# Patient Record
Sex: MALE | Race: WHITE | Employment: OTHER | URBAN - METROPOLITAN AREA
[De-identification: names, ages, dates, MRNs, and addresses within clinical notes are randomized per-mention and may not be internally consistent; named-entity substitution may affect disease eponyms.]

---

## 2019-05-27 ENCOUNTER — HOSPITAL ENCOUNTER (INPATIENT)
Age: 74
LOS: 3 days | Discharge: HOME | DRG: 042 | End: 2019-05-30
Attending: INTERNAL MEDICINE | Admitting: INTERNAL MEDICINE
Payer: MEDICARE

## 2019-05-27 PROBLEM — I10 ESSENTIAL HYPERTENSION: Status: ACTIVE | Noted: 2019-05-27

## 2019-05-27 PROBLEM — G45.9 TIA (TRANSIENT ISCHEMIC ATTACK): Status: ACTIVE | Noted: 2019-05-27

## 2019-05-27 PROBLEM — E78.5 HLD (HYPERLIPIDEMIA): Status: ACTIVE | Noted: 2019-05-27

## 2019-05-27 PROBLEM — J44.9 COPD (CHRONIC OBSTRUCTIVE PULMONARY DISEASE) (HCC): Status: ACTIVE | Noted: 2019-05-27

## 2019-05-27 LAB
TROPONIN: <0.01 NG/ML (ref 0–0.03)
TROPONIN: <0.01 NG/ML (ref 0–0.03)

## 2019-05-27 PROCEDURE — 99221 1ST HOSP IP/OBS SF/LOW 40: CPT | Performed by: PSYCHIATRY & NEUROLOGY

## 2019-05-27 PROCEDURE — 84484 ASSAY OF TROPONIN QUANT: CPT

## 2019-05-27 PROCEDURE — 36415 COLL VENOUS BLD VENIPUNCTURE: CPT

## 2019-05-27 PROCEDURE — 2060000000 HC ICU INTERMEDIATE R&B

## 2019-05-27 PROCEDURE — 94640 AIRWAY INHALATION TREATMENT: CPT

## 2019-05-27 PROCEDURE — 6370000000 HC RX 637 (ALT 250 FOR IP): Performed by: INTERNAL MEDICINE

## 2019-05-27 PROCEDURE — 2580000003 HC RX 258: Performed by: INTERNAL MEDICINE

## 2019-05-27 PROCEDURE — 2700000000 HC OXYGEN THERAPY PER DAY

## 2019-05-27 PROCEDURE — 6360000002 HC RX W HCPCS: Performed by: INTERNAL MEDICINE

## 2019-05-27 RX ORDER — NITROGLYCERIN 40 MG/1
1 PATCH TRANSDERMAL PRN
Status: ON HOLD | COMMUNITY
End: 2019-05-30 | Stop reason: HOSPADM

## 2019-05-27 RX ORDER — AMLODIPINE BESYLATE 2.5 MG/1
2.5 TABLET ORAL DAILY
Status: DISCONTINUED | OUTPATIENT
Start: 2019-05-27 | End: 2019-05-30 | Stop reason: HOSPADM

## 2019-05-27 RX ORDER — FLUTICASONE PROPIONATE 50 MCG
1 SPRAY, SUSPENSION (ML) NASAL DAILY
COMMUNITY

## 2019-05-27 RX ORDER — THIAMINE MONONITRATE (VIT B1) 100 MG
100 TABLET ORAL DAILY
Status: DISCONTINUED | OUTPATIENT
Start: 2019-05-27 | End: 2019-05-30 | Stop reason: HOSPADM

## 2019-05-27 RX ORDER — ISOSORBIDE MONONITRATE 60 MG/1
60 TABLET, EXTENDED RELEASE ORAL DAILY
Status: DISCONTINUED | OUTPATIENT
Start: 2019-05-27 | End: 2019-05-30 | Stop reason: HOSPADM

## 2019-05-27 RX ORDER — ROSUVASTATIN CALCIUM 10 MG/1
10 TABLET, COATED ORAL DAILY
Status: DISCONTINUED | OUTPATIENT
Start: 2019-05-27 | End: 2019-05-30 | Stop reason: HOSPADM

## 2019-05-27 RX ORDER — ACETAMINOPHEN 325 MG/1
650 TABLET ORAL EVERY 4 HOURS PRN
Status: DISCONTINUED | OUTPATIENT
Start: 2019-05-27 | End: 2019-05-30 | Stop reason: HOSPADM

## 2019-05-27 RX ORDER — LEVETIRACETAM 500 MG/1
500 TABLET ORAL 2 TIMES DAILY
COMMUNITY

## 2019-05-27 RX ORDER — LEVETIRACETAM 500 MG/1
500 TABLET ORAL 2 TIMES DAILY
Status: DISCONTINUED | OUTPATIENT
Start: 2019-05-27 | End: 2019-05-30 | Stop reason: HOSPADM

## 2019-05-27 RX ORDER — FORMOTEROL FUMARATE 20 UG/2ML
20 SOLUTION RESPIRATORY (INHALATION) 2 TIMES DAILY
COMMUNITY

## 2019-05-27 RX ORDER — AMLODIPINE BESYLATE 2.5 MG/1
2.5 TABLET ORAL DAILY
COMMUNITY

## 2019-05-27 RX ORDER — BUDESONIDE 0.25 MG/2ML
250 INHALANT ORAL 2 TIMES DAILY
Status: DISCONTINUED | OUTPATIENT
Start: 2019-05-27 | End: 2019-05-30 | Stop reason: HOSPADM

## 2019-05-27 RX ORDER — ROSUVASTATIN CALCIUM 10 MG/1
10 TABLET, COATED ORAL DAILY
COMMUNITY

## 2019-05-27 RX ORDER — CLOPIDOGREL BISULFATE 75 MG/1
75 TABLET ORAL DAILY
Status: DISCONTINUED | OUTPATIENT
Start: 2019-05-27 | End: 2019-05-30 | Stop reason: HOSPADM

## 2019-05-27 RX ORDER — LABETALOL HYDROCHLORIDE 5 MG/ML
10 INJECTION, SOLUTION INTRAVENOUS EVERY 10 MIN PRN
Status: DISCONTINUED | OUTPATIENT
Start: 2019-05-27 | End: 2019-05-30 | Stop reason: HOSPADM

## 2019-05-27 RX ORDER — CETIRIZINE HYDROCHLORIDE 10 MG/1
10 TABLET ORAL DAILY
Status: DISCONTINUED | OUTPATIENT
Start: 2019-05-27 | End: 2019-05-30 | Stop reason: HOSPADM

## 2019-05-27 RX ORDER — EZETIMIBE 10 MG/1
10 TABLET ORAL DAILY
COMMUNITY

## 2019-05-27 RX ORDER — BUDESONIDE 0.25 MG/2ML
1 INHALANT ORAL 2 TIMES DAILY
COMMUNITY

## 2019-05-27 RX ORDER — PANTOPRAZOLE SODIUM 40 MG/1
40 TABLET, DELAYED RELEASE ORAL DAILY
COMMUNITY

## 2019-05-27 RX ORDER — ONDANSETRON 2 MG/ML
4 INJECTION INTRAMUSCULAR; INTRAVENOUS EVERY 6 HOURS PRN
Status: DISCONTINUED | OUTPATIENT
Start: 2019-05-27 | End: 2019-05-30 | Stop reason: HOSPADM

## 2019-05-27 RX ORDER — CLOPIDOGREL BISULFATE 75 MG/1
75 TABLET ORAL DAILY
COMMUNITY

## 2019-05-27 RX ORDER — SODIUM CHLORIDE 0.9 % (FLUSH) 0.9 %
10 SYRINGE (ML) INJECTION PRN
Status: DISCONTINUED | OUTPATIENT
Start: 2019-05-27 | End: 2019-05-30 | Stop reason: HOSPADM

## 2019-05-27 RX ORDER — TERAZOSIN 1 MG/1
1 CAPSULE ORAL 2 TIMES DAILY
COMMUNITY

## 2019-05-27 RX ORDER — SODIUM CHLORIDE 0.9 % (FLUSH) 0.9 %
10 SYRINGE (ML) INJECTION EVERY 12 HOURS SCHEDULED
Status: DISCONTINUED | OUTPATIENT
Start: 2019-05-27 | End: 2019-05-30 | Stop reason: HOSPADM

## 2019-05-27 RX ORDER — PANTOPRAZOLE SODIUM 40 MG/1
40 TABLET, DELAYED RELEASE ORAL DAILY
Status: DISCONTINUED | OUTPATIENT
Start: 2019-05-27 | End: 2019-05-30 | Stop reason: HOSPADM

## 2019-05-27 RX ORDER — ISOSORBIDE MONONITRATE 60 MG/1
60 TABLET, EXTENDED RELEASE ORAL DAILY
COMMUNITY

## 2019-05-27 RX ORDER — FLUTICASONE PROPIONATE 50 MCG
1 SPRAY, SUSPENSION (ML) NASAL DAILY
Status: DISCONTINUED | OUTPATIENT
Start: 2019-05-27 | End: 2019-05-30 | Stop reason: HOSPADM

## 2019-05-27 RX ORDER — FEXOFENADINE HCL 180 MG/1
180 TABLET ORAL DAILY
COMMUNITY

## 2019-05-27 RX ORDER — FORMOTEROL FUMARATE 20 UG/2ML
20 SOLUTION RESPIRATORY (INHALATION) 2 TIMES DAILY
Status: DISCONTINUED | OUTPATIENT
Start: 2019-05-27 | End: 2019-05-30 | Stop reason: HOSPADM

## 2019-05-27 RX ORDER — DOXAZOSIN MESYLATE 1 MG/1
1 TABLET ORAL DAILY
Status: DISCONTINUED | OUTPATIENT
Start: 2019-05-27 | End: 2019-05-30 | Stop reason: HOSPADM

## 2019-05-27 RX ORDER — ASPIRIN 81 MG/1
81 TABLET, CHEWABLE ORAL DAILY
COMMUNITY

## 2019-05-27 RX ORDER — ASPIRIN 81 MG/1
81 TABLET, CHEWABLE ORAL DAILY
Status: DISCONTINUED | OUTPATIENT
Start: 2019-05-27 | End: 2019-05-27 | Stop reason: SDUPTHER

## 2019-05-27 RX ORDER — THIAMINE MONONITRATE (VIT B1) 100 MG
100 TABLET ORAL DAILY
COMMUNITY

## 2019-05-27 RX ORDER — ASPIRIN 81 MG/1
81 TABLET ORAL DAILY
Status: DISCONTINUED | OUTPATIENT
Start: 2019-05-28 | End: 2019-05-30 | Stop reason: HOSPADM

## 2019-05-27 RX ADMIN — Medication 10 ML: at 21:10

## 2019-05-27 RX ADMIN — BUDESONIDE 250 MCG: 0.25 SUSPENSION RESPIRATORY (INHALATION) at 20:33

## 2019-05-27 RX ADMIN — LEVETIRACETAM 500 MG: 500 TABLET ORAL at 21:10

## 2019-05-27 RX ADMIN — FORMOTEROL FUMARATE DIHYDRATE 20 MCG: 20 SOLUTION RESPIRATORY (INHALATION) at 20:33

## 2019-05-27 RX ADMIN — METOPROLOL TARTRATE 25 MG: 25 TABLET, FILM COATED ORAL at 21:10

## 2019-05-27 ASSESSMENT — PAIN SCALES - GENERAL
PAINLEVEL_OUTOF10: 0
PAINLEVEL_OUTOF10: 0

## 2019-05-27 NOTE — H&P
Hospital Medicine History & Physical      PCP: Naina Reza    Date of Admission: 5/27/2019    Date of Service: Pt seen/examined on 5/27/2019 and placed in Observation. Chief Complaint:  TIA vs Stroke      History Of Present Illness:    Mr. Rolinda Saint, a 74y.o. year old male  who  has a past medical history of COPD (chronic obstructive pulmonary disease) ([facility] Lung cancer ([facility] MI (myocardial infarction) ([facility] and Renal cancer ([facility] Presented with complaints of visual changes that started last night. he states that he has had multiple strokes before but never like this. he describes it as being able to only see 1/2 of objects. he rates it as moderate in intensity. Nothing makes it better and nothing makes it worse. he admits to visual changes. he denies fevers, chills, chest pain, shortness of breath, nausea or vomiting. Past Medical History:          Diagnosis Date    COPD (chronic obstructive pulmonary disease) ([facility]     Lung cancer (HCC)     MI (myocardial infarction) ([facility]     History of 4 MIs.  Renal cancer [facility])        Past Surgical History:          Procedure Laterality Date    CAROTID ENDARTERECTOMY Left     CORONARY ANGIOPLASTY WITH STENT PLACEMENT      5 stents total    CORONARY ARTERY BYPASS GRAFT      4 vessels    LUNG REMOVAL, PARTIAL      left upper lung removal       Medications Prior to Admission:      Prior to Admission medications    Medication Sig Start Date End Date Taking?  Authorizing Provider   clopidogrel (PLAVIX) 75 MG tablet Take 75 mg by mouth daily   Yes Historical Provider, MD   pantoprazole (PROTONIX) 40 MG tablet Take 40 mg by mouth daily   Yes Historical Provider, MD   levETIRAcetam (KEPPRA) 500 MG tablet Take 500 mg by mouth 2 times daily   Yes Historical Provider, MD   ezetimibe (ZETIA) 10 MG tablet Take 10 mg by mouth daily   Yes Historical Provider, MD   terazosin (HYTRIN) 1 MG capsule Take 1 mg by mouth 2 times daily   Yes Historical Provider, MD metoprolol tartrate (LOPRESSOR) 25 MG tablet Take 25 mg by mouth 2 times daily   Yes Historical Provider, MD   aspirin 81 MG chewable tablet Take 81 mg by mouth daily   Yes Historical Provider, MD   fluticasone (FLONASE) 50 MCG/ACT nasal spray 1 spray by Each Nostril route daily   Yes Historical Provider, MD   isosorbide mononitrate (IMDUR) 60 MG extended release tablet Take 60 mg by mouth daily   Yes Historical Provider, MD   rosuvastatin (CRESTOR) 10 MG tablet Take 10 mg by mouth daily   Yes Historical Provider, MD   fexofenadine (ALLEGRA ALLERGY) 180 MG tablet Take 180 mg by mouth daily   Yes Historical Provider, MD   amLODIPine (NORVASC) 2.5 MG tablet Take 2.5 mg by mouth daily   Yes Historical Provider, MD   nitroGLYCERIN (NITRODUR) 0.2 MG/HR Place 1 patch onto the skin as needed   Yes Historical Provider, MD   vitamin B-1 (THIAMINE) 100 MG tablet Take 100 mg by mouth daily   Yes Historical Provider, MD   budesonide (PULMICORT) 0.25 MG/2ML nebulizer suspension Take 1 ampule by nebulization 2 times daily   Yes Historical Provider, MD   formoterol (PERFOROMIST) 20 MCG/2ML nebulizer solution Take 20 mcg by nebulization 2 times daily   Yes Historical Provider, MD   OXYGEN 3-4 L/day by Other route nightly as needed (As needed for shortness of breath and decreased SpO2)   Yes Historical Provider, MD       Allergies:  Morphine; Lipitor [atorvastatin calcium]; Prednisone; Adhesive tape; and Neosporin [neomycin-polymyxin-gramicidin]    Social History:    The patient currently lives at home  TOBACCO:   reports that he has quit smoking. He has a 27.00 pack-year smoking history. He has never used smokeless tobacco.  ETOH:   has no alcohol history on file. Family History:          Problem Relation Age of Onset    Heart Attack Mother     Cancer Father     Coronary Art Dis Father        REVIEW OF SYSTEMS:   Pertinent positives as noted in the HPI. All other systems reviewed and negative.     PHYSICAL EXAM:  BP (!) 116/53   Pulse 73   Temp 98.2 °F (36.8 °C) (Temporal)   Resp 18   Ht 5' 10\" (1.778 m)   Wt 185 lb (83.9 kg)   SpO2 95%   BMI 26.54 kg/m²   General appearance: No apparent distress, appears stated age and cooperative. HEENT: Normal cephalic, atraumatic without obvious deformity. Pupils equal, round, and reactive to light. Extra ocular muscles intact. Conjunctivae/corneas clear. Neck: Supple, with full range of motion. No jugular venous distention. Trachea midline. Respiratory:  Normal respiratory effort. Clear to auscultation, bilaterally without Rales/Wheezes/Rhonchi. Cardiovascular: Regular rate and rhythm with normal S1/S2 without murmurs, rubs or gallops. Brisk capillary refill. 2+ lower extremity pulses (dorsalis pedis). Abdomen: Soft, non-tender, non-distended with normal bowel sounds. Musculoskeletal: No clubbing, cyanosis or edema bilaterally. Full range of motion without deformity of right upper and lower extremities. Contracture noted of L upper extremity due to previous stroke. Brisk capillary refill. 2+ lower extremity pulses (dorsalis pedis). Skin: Normal skin color. No rashes or lesions. Neurologic:  Neurovascularly intact without any focal sensory/motor deficits. Cranial nerves: II-XII intact, grossly non-focal.  Psychiatric: Alert and oriented, thought content appropriate, normal insight    Labs:     No results for input(s): WBC, HGB, HCT, PLT in the last 72 hours. No results for input(s): NA, K, CL, CO2, BUN, CREATININE, CALCIUM, PHOS in the last 72 hours. Invalid input(s): MAGNES  No results for input(s): AST, ALT, BILIDIR, BILITOT, ALKPHOS in the last 72 hours. No results for input(s): INR in the last 72 hours.   Recent Labs     05/27/19  1758 05/27/19  2209   TROPONINI <0.01 <0.01       Urinalysis:    No results found for: NITRU, WBCUA, BACTERIA, RBCUA, BLOODU, SPECGRAV, GLUCOSEU    Imaging:  MRI brain without contrast    (Results Pending)   MRA Head WO Contrast    (Results Pending)   MRA Neck WO Contrast    (Results Pending)           ASSESSMENT:  Active Hospital Problems    Diagnosis Date Noted    TIA (transient ischemic attack) [G45.9] 05/27/2019    Essential hypertension [I10] 05/27/2019    HLD (hyperlipidemia) [E78.5] 05/27/2019    COPD (chronic obstructive pulmonary disease) ([facility] [J44.9] 05/27/2019    Cerebrovascular accident (CVA) due to embolism of right posterior cerebral artery [facility] [I63.431]      Plan:  · MRI of brain  · Neurology consult  · Continue home medications for HTN and HLD and COPD  · Lipid panel and Hb1C in the AM  Body mass index is 26.54 kg/m². DVT Prophylaxis  · Lovenox    Diet  DIET CARDIAC;    Code Status  Full Code    PT/OT Eval Status  · Consulted    Disposition  · [facility] [address], DO  Chief Hospitalist - [facility]  Please contact me via Perfect Serve        NOTE: This report was transcribed using voice recognition software. Every effort was made to ensure accuracy; however, inadvertent computerized transcription errors may be present.

## 2019-05-27 NOTE — CONSULTS
Consult to Neurology  Consult performed by: Malini Cabezas MD  Consult ordered by: Taryn Culp DO        Neurology Consult Note    5/27/2019     REASON FOR CONSULTATION:   Stroke       HISTORY OF PRESENT ILLNESS:   57-year-old man presented to another hospital for problem in his vision. Last night when he wanted to use bathroom and his digital clocks he was sitting minutes but not hours. The same problem continued when he presented to outside hospital but seems coming to hear it seems that it resolved to some extent. No additional weakness or numbness in his extremities. No headache. No dysphagia. No shortness of breath or chest pain. He was on aspirin 81 mg, Plavix and Keppra 500 mg twice a day and Crestor 10 mg which were all continued in this hospitalization. PMHx: HTN and HLP and multiple strokes in past      Prior to Admission medications    Medication Sig Start Date End Date Taking?  Authorizing Provider   clopidogrel (PLAVIX) 75 MG tablet Take 75 mg by mouth daily   Yes Historical Provider, MD   pantoprazole (PROTONIX) 40 MG tablet Take 40 mg by mouth daily   Yes Historical Provider, MD   levETIRAcetam (KEPPRA) 500 MG tablet Take 500 mg by mouth 2 times daily   Yes Historical Provider, MD   ezetimibe (ZETIA) 10 MG tablet Take 10 mg by mouth daily   Yes Historical Provider, MD   terazosin (HYTRIN) 1 MG capsule Take 1 mg by mouth 2 times daily   Yes Historical Provider, MD   metoprolol tartrate (LOPRESSOR) 25 MG tablet Take 25 mg by mouth 2 times daily   Yes Historical Provider, MD   aspirin 81 MG chewable tablet Take 81 mg by mouth daily   Yes Historical Provider, MD   fluticasone (FLONASE) 50 MCG/ACT nasal spray 1 spray by Each Nostril route daily   Yes Historical Provider, MD   isosorbide mononitrate (IMDUR) 60 MG extended release tablet Take 60 mg by mouth daily   Yes Historical Provider, MD   rosuvastatin (CRESTOR) 10 MG tablet Take 10 mg by mouth daily   Yes Historical Provider, MD B/L.  Coordination: intact F-N and H-S B/L. No dysmetria. Intact ANAND.      ASSESSMENT:  Left homonymous hemianopsia which resolved to some extent and left him with left upper quadrantanopsia. CT from outside hospital showed possibility of old left parietal stroke and subacute right occipital stroke on my own personal reading. There is no official report from outside [facility].      PLAN:   Continue aspirin and Plavix  Continue Crestor 10 mg  LDL and hemoglobin A1c  Brain MRI/MRA head and neck  Telemetry monitoring  Although unlikely I will request ESR and CRP to rule out GCA        Electronically signed by Fred Adams MD on 5/27/2019 at 6:30 PM

## 2019-05-28 ENCOUNTER — APPOINTMENT (OUTPATIENT)
Dept: MRI IMAGING | Age: 74
DRG: 042 | End: 2019-05-28
Attending: INTERNAL MEDICINE
Payer: MEDICARE

## 2019-05-28 ENCOUNTER — APPOINTMENT (OUTPATIENT)
Dept: GENERAL RADIOLOGY | Age: 74
DRG: 042 | End: 2019-05-28
Attending: INTERNAL MEDICINE
Payer: MEDICARE

## 2019-05-28 LAB
ANION GAP SERPL CALCULATED.3IONS-SCNC: 13 MMOL/L (ref 7–16)
BUN BLDV-MCNC: 16 MG/DL (ref 8–23)
C-REACTIVE PROTEIN: 0.3 MG/DL (ref 0–0.4)
CALCIUM SERPL-MCNC: 9 MG/DL (ref 8.6–10.2)
CHLORIDE BLD-SCNC: 94 MMOL/L (ref 98–107)
CHOLESTEROL, TOTAL: 126 MG/DL (ref 0–199)
CO2: 25 MMOL/L (ref 22–29)
CREAT SERPL-MCNC: 1.6 MG/DL (ref 0.7–1.2)
GFR AFRICAN AMERICAN: 51
GFR NON-AFRICAN AMERICAN: 42 ML/MIN/1.73
GLUCOSE BLD-MCNC: 123 MG/DL (ref 74–99)
HBA1C MFR BLD: 5.2 % (ref 4–5.6)
HDLC SERPL-MCNC: 57 MG/DL
LDL CHOLESTEROL CALCULATED: 48 MG/DL (ref 0–99)
POTASSIUM REFLEX MAGNESIUM: 4.2 MMOL/L (ref 3.5–5)
SEDIMENTATION RATE, ERYTHROCYTE: 35 MM/HR (ref 0–15)
SODIUM BLD-SCNC: 132 MMOL/L (ref 132–146)
TRIGL SERPL-MCNC: 105 MG/DL (ref 0–149)
VLDLC SERPL CALC-MCNC: 21 MG/DL

## 2019-05-28 PROCEDURE — 97530 THERAPEUTIC ACTIVITIES: CPT

## 2019-05-28 PROCEDURE — 94640 AIRWAY INHALATION TREATMENT: CPT

## 2019-05-28 PROCEDURE — 71045 X-RAY EXAM CHEST 1 VIEW: CPT

## 2019-05-28 PROCEDURE — 70547 MR ANGIOGRAPHY NECK W/O DYE: CPT

## 2019-05-28 PROCEDURE — 2580000003 HC RX 258: Performed by: INTERNAL MEDICINE

## 2019-05-28 PROCEDURE — 97162 PT EVAL MOD COMPLEX 30 MIN: CPT

## 2019-05-28 PROCEDURE — 2060000000 HC ICU INTERMEDIATE R&B

## 2019-05-28 PROCEDURE — 97165 OT EVAL LOW COMPLEX 30 MIN: CPT

## 2019-05-28 PROCEDURE — 2700000000 HC OXYGEN THERAPY PER DAY

## 2019-05-28 PROCEDURE — 70544 MR ANGIOGRAPHY HEAD W/O DYE: CPT

## 2019-05-28 PROCEDURE — 80061 LIPID PANEL: CPT

## 2019-05-28 PROCEDURE — 83036 HEMOGLOBIN GLYCOSYLATED A1C: CPT

## 2019-05-28 PROCEDURE — 70551 MRI BRAIN STEM W/O DYE: CPT

## 2019-05-28 PROCEDURE — 80048 BASIC METABOLIC PNL TOTAL CA: CPT

## 2019-05-28 PROCEDURE — 36415 COLL VENOUS BLD VENIPUNCTURE: CPT

## 2019-05-28 PROCEDURE — 6370000000 HC RX 637 (ALT 250 FOR IP): Performed by: INTERNAL MEDICINE

## 2019-05-28 PROCEDURE — 86140 C-REACTIVE PROTEIN: CPT

## 2019-05-28 PROCEDURE — 6360000002 HC RX W HCPCS: Performed by: INTERNAL MEDICINE

## 2019-05-28 PROCEDURE — 85651 RBC SED RATE NONAUTOMATED: CPT

## 2019-05-28 RX ADMIN — FORMOTEROL FUMARATE DIHYDRATE 20 MCG: 20 SOLUTION RESPIRATORY (INHALATION) at 22:00

## 2019-05-28 RX ADMIN — LEVETIRACETAM 500 MG: 500 TABLET ORAL at 21:20

## 2019-05-28 RX ADMIN — ASPIRIN 81 MG: 81 TABLET ORAL at 08:36

## 2019-05-28 RX ADMIN — METOPROLOL TARTRATE 25 MG: 25 TABLET, FILM COATED ORAL at 08:37

## 2019-05-28 RX ADMIN — BUDESONIDE 250 MCG: 0.25 SUSPENSION RESPIRATORY (INHALATION) at 22:02

## 2019-05-28 RX ADMIN — BUDESONIDE 250 MCG: 0.25 SUSPENSION RESPIRATORY (INHALATION) at 10:00

## 2019-05-28 RX ADMIN — ROSUVASTATIN CALCIUM 10 MG: 10 TABLET, FILM COATED ORAL at 08:37

## 2019-05-28 RX ADMIN — AMLODIPINE BESYLATE 2.5 MG: 2.5 TABLET ORAL at 08:37

## 2019-05-28 RX ADMIN — Medication 100 MG: at 08:37

## 2019-05-28 RX ADMIN — METOPROLOL TARTRATE 25 MG: 25 TABLET, FILM COATED ORAL at 21:20

## 2019-05-28 RX ADMIN — Medication 10 ML: at 21:21

## 2019-05-28 RX ADMIN — FORMOTEROL FUMARATE DIHYDRATE 20 MCG: 20 SOLUTION RESPIRATORY (INHALATION) at 10:00

## 2019-05-28 RX ADMIN — PANTOPRAZOLE SODIUM 40 MG: 40 TABLET, DELAYED RELEASE ORAL at 08:37

## 2019-05-28 RX ADMIN — DOXAZOSIN 1 MG: 2 TABLET ORAL at 08:37

## 2019-05-28 RX ADMIN — ISOSORBIDE MONONITRATE 60 MG: 60 TABLET ORAL at 08:37

## 2019-05-28 RX ADMIN — LEVETIRACETAM 500 MG: 500 TABLET ORAL at 08:37

## 2019-05-28 RX ADMIN — Medication 10 ML: at 08:37

## 2019-05-28 RX ADMIN — CLOPIDOGREL 75 MG: 75 TABLET, FILM COATED ORAL at 08:37

## 2019-05-28 RX ADMIN — FLUTICASONE PROPIONATE 1 SPRAY: 50 SPRAY, METERED NASAL at 08:36

## 2019-05-28 RX ADMIN — CETIRIZINE HYDROCHLORIDE 10 MG: 10 TABLET, FILM COATED ORAL at 08:37

## 2019-05-28 ASSESSMENT — PAIN SCALES - GENERAL
PAINLEVEL_OUTOF10: 0

## 2019-05-28 NOTE — PROGRESS NOTES
Occupational Therapy  OCCUPATIONAL THERAPY INITIAL EVALUATION      Date:2019  Patient Name: Chhaya Moody  MRN: [id number]  : 1945  Room: [address] [place] [address]    Evaluating OT: Bertha Bear. Susan Downs, OTR/L #[id number]      AM-PAC Daily Activity Raw Score: 15/24  Modified [place] Scale (MRS)  Score     Description  0             No symptoms  1             No significant disability despite symptoms  2             Slight disability; able to look after own affairs  3             Moderate disability; able to ambulate without assist/ requires assist with ADLs  4             Moderate/Severe disability;requires assist to ambulate/assist with ADLs  5             Severe disability;bedridden/incontinent   6               Score:   4    Recommended Adaptive Equipment: built up feeding utencil    Diagnosis: TIA   Patient Active Problem List   Diagnosis    TIA (transient ischemic attack)    Essential hypertension    HLD (hyperlipidemia)    COPD (chronic obstructive pulmonary disease) ([facility]    Cerebrovascular accident (CVA) due to embolism of right posterior cerebral artery ([facility]       Surgery: CABG, partial lung removal    Pertinent Medical History:   Past Medical History:   Diagnosis Date    COPD (chronic obstructive pulmonary disease) ([facility]     Lung cancer ([facility]     MI (myocardial infarction) ([facility] [address]     History of 4 MIs.  Renal cancer ([facility] [address] [facility]         Precautions:  Falls, O2 2L, decreased peripheral vision to R , patient states seizures     Home Living: Pt lives with wife and son- primary CG in a one story home  with no step(s) to enter and no rail(s); ramped entry bed/bath on main floor   Bathroom setup: walk in shower with seat and standard commode with HR   Equipment owned: w/c , ramp, shower seat, AE for LE dressing and bathing  Prior Level of Function:   Assisted as needed with ADLs ,  assisted with IADLs; using 2 canes for in home and w/c for community for ambulation.    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 and worked as a printer of [employer]    Pain Level: kidney pain- ahces  Cognition: A&O: 3/3; Follows multi step directions with increased volume   Memory:  fair    Sequencing:  good    Problem solving:  good    Judgement/safety:  good -     Functional Assessment:   Initial Eval Status  Date: 4/28/19 Treatment Status  Date: Short Term Goals  Treatment frequency: PRN 1-3 x/week   Feeding setup   Mod I    Grooming Setup for hair care using L hand  Mod I    UB Dressing SBA  Mod I   LB Dressing Dependent   Mod I with AE prn    Bathing n/t  SBA with AE and DME    Toileting CGA   Mod I    Bed Mobility  Supine to sit: min A   Sit to supine: n/t  Supine to sit: mod I   Sit to supine: mod I has HR's   Functional Transfers Sit to stand: CGA  Stand to sit:  SBA  Stand pivot: SBA  Mod I with AD prn   Functional Mobility CGA  Mod I    Balance Sitting:     Static:  good    Dynamic:fair  Standing: good-     Activity Tolerance Good- O2 on room air 94% after mobility  good   Visual/  Perceptual Glasses: noted decreased peripheral vision to L side         Safety Good-                 good     Hand dominance: L   UE ROM: RUE:  WFL  LUE:  WFL  Strength: RUE:  4+/5 LUE:  4/5   Strength R good L fair+   Fine Motor Coordination: R  WFL L poor with noted MP ulnar deviation and intrinsic tightness    Hearing: [place]   Sensation:  No c/o numbness or tingling  Tone:  WFL  Edema: none noted                            Comments/Treatment: Upon arrival, patient supine and agreeable to evaluation. OT evaluation performed with  Education on stroke signs and symptoms and decreased awareness of L side with peripheral vision cut noted on L side. Performed bed mobility, LE dressing, grooming seated in chair and STM word recall. Bathroom safety and AE /DME review performed.  At end of session, patient sitting up in chair and  with call light and phone within reach, all lines and tubes intact. Nursing notified. Skilled O2 monitoring performed throughout evaluation. OT for functional assessment of  ADL, Functional Transfer/Mobility Training, Equipment Needs, Energy Conservation Techniques, Pt/Family Education, Ther Ex- deep breathing for edema and pain control., OT role and POC reviewed. Patient  demo good- understanding of functional limitations and safety. Pt would benefit from continued skilled OT to increase functional independence and quality of life. Eval Complexity: low    Assessment of current deficits   Functional mobility [x]  ADLs [x] Strength []  Cognition []  Functional transfers  [x] IADLs [x] Safety Awareness [x]  Endurance [x]  Fine Motor Coordination [x] Balance [] Vision/perception [x] Sensation []   Gross Motor Coordination [] ROM [] Delirium []                  Motor Control []    Plan of Care  ADL retraining [x]   Equipment needs [x]   Neuromuscular re-education [] Energy Conservation Techniques [x]  Functional Transfer training [x] Patient and/or Family Education [x]  Functional Mobility training [x]  Environmental Modifications [x]  Cognitive re-training []   Compensatory techniques for ADLs [x]  Splinting Needs []   Positioning to improve overall function [x]   Therapeutic Activity [x]  Therapeutic Exercise  [x]  Visual/Perceptual: []    Delirium prevention/treatment  []   Other:  []    Rehab Potential: Good for established goals    Patient / Family Goal: home with assistance as needed    Patient and/or family were instructed diagnosis, prognosis/goals and plan of care. yes Demonstrated good understanding. [] Malnutrition indicators have been identified and nursing has been notified to ensure a dietitian consult is ordered. OT Evaluation + 15  treatment minutes  Tx. Time in: 8:15  Tx. Time out: 8:30    Isaac Alvarado.  [address], [address]

## 2019-05-28 NOTE — PROGRESS NOTES
Physical Therapy  Initial Assessment     Name: aLlo Naik  :   MRN: [id number]    Date of Service: 2019    Evaluating PT: Trenton Rubio PT, DPT [id number]    Room #:  0667/8394-M    Diagnosis: TIA  Precautions: Fall risk, R peripheral vision impaired, [place]    Pt lives with wife and son in a single story house with ramped entry. Bed is on the first floor and bath is on the first floor. Pt ambulated with 2 SPC Mod Independent prior to admission. Son is primary caregiver. Pt is on 4 L O2/min at night when at home. Initial Evaluation  Date: 19 Treatment Date: Short Term/ Long Term   Goals   AM-PAC 6 Clicks      Was pt agreeable to Eval/treatment? Yes     Does pt have pain? No current complaints of pain     Bed Mobility  Rolling: SBA  Supine to sit: NT  Sit to supine: Min A  Scooting: SBA toward center of bed  Rolling: Independent   Supine to sit: Independent   Sit to supine: Independent   Scooting: Independent    Transfers Sit to stand: Min A  Stand to sit: Min A  Stand pivot: Min A with [employer]  Sit to stand: Supervision  Stand to sit: Supervision  Stand pivot: Supervision with [employer]   Ambulation   15 feet with [employer] with Min A  >150 feet with [employer] with Supervision   Stair negotiation: NT  NA   ROM B UE: Refer to OT note  B LE: WFL     Strength B UE: Refer to OT note  B LE: 4/5 globally     Balance Sitting EOB: SBA  Dynamic standing: Min A with [employer]  Sitting EOB: Independent   Dynamic standing: Supervision with [employer]     Pt is A & O x: 4 to person, place, month/year, and situation. Sensation: Pt denies numbness and tingling to extremities. B LE light touch sensation intact. Coordination: NT  Edema: Unremarkable. ASSESSMENT    Patient education  Pt educated on hand placement during sit to stand transfer.     Patient response to education:   Pt verbalized understanding Pt demonstrated skill Pt requires further education in this area   Yes With assistance Yes     Comments:  Pt was seated in bedside chair upon room entry, agreeable to PT evaluation. Pt is very [place]. Pt required cueing for hand placement during sit to stand transfer. Pt was unsuccessful on first sit to stand transfer from chair. Pt was cued to increase forward lean and was successful on second attempt. Pt was mildly retropulsive once standing but able to correct with [employer]. Pt reported he was very tired and requested to keep ambulation distance to a minimum. Pt ambulates with very slow gait speed and small steps. Pt required increased time during turning but had fair steadiness throughout. Pt ambulated back to EOB. Pt attempted to abandon [employer] and began reaching for bed rail; pt cued for technique/safety. Pt was seated and assisted to supine position. Pt was mildly SOB with activity but O2 sat remained at 94% on RA. Pt was left supine in bed with all needs met at conclusion of session. Pts/family goals: To return home. Patient and or family understand(s) diagnosis, prognosis, and plan of care:  Yes. PLAN  PT care will be provided in accordance with the objectives noted above. Whenever appropriate, clear delegation orders will be provided for nursing staff. Exercises and functional mobility practice will be used as well as appropriate assistive devices or modalities to obtain goals. Patient and family education will also be administered as needed. Frequency of treatments: 2-5x/week x 2-3 days.     Time in: 0935  Time out: [address], PT, DPT  [id number]

## 2019-05-28 NOTE — PROGRESS NOTES
Hospitalist Progress Note      PCP: Claudette Castro    Date of Admission: 5/27/2019  Days in the hospital: 1    Chief Complaint: TIA     Hospital Course:     Seen by neurology. To have MRI of brain and MRA of head and neck. Subjective  Patient seen and examined at bedside. Patient states that he is feeling better today  Patient denies any fevers, chills, chest pain, shortness of breath, nausea, vomiting. Medications:  Reviewed    Infusion Medications   Scheduled Medications    sodium chloride flush  10 mL Intravenous 2 times per day    enoxaparin  40 mg Subcutaneous Daily    aspirin  81 mg Oral Daily    amLODIPine  2.5 mg Oral Daily    budesonide  250 mcg Nebulization BID    clopidogrel  75 mg Oral Daily    cetirizine  10 mg Oral Daily    fluticasone  1 spray Each Nostril Daily    formoterol  20 mcg Nebulization BID    isosorbide mononitrate  60 mg Oral Daily    levETIRAcetam  500 mg Oral BID    metoprolol tartrate  25 mg Oral BID    pantoprazole  40 mg Oral Daily    rosuvastatin  10 mg Oral Daily    doxazosin  1 mg Oral Daily    vitamin B-1  100 mg Oral Daily     PRN Meds: sodium chloride flush, magnesium hydroxide, ondansetron, perflutren lipid microspheres, acetaminophen, labetalol      Intake/Output Summary (Last 24 hours) at 5/28/2019 0923  Last data filed at 5/28/2019 0415  Gross per 24 hour   Intake 360 ml   Output 1200 ml   Net -840 ml       Exam:    BP (!) 148/72   Pulse 67   Temp 97 °F (36.1 °C) (Temporal)   Resp 16   Ht 5' 10\" (1.778 m)   Wt 185 lb (83.9 kg)   SpO2 98%   BMI 26.54 kg/m²     General appearance: No apparent distress, appears stated age and cooperative. HEENT: Normal cephalic, atraumatic without obvious deformity. Pupils equal, round, and reactive to light. Extra ocular muscles intact. Conjunctivae/corneas clear. Neck: Supple, with full range of motion. No jugular venous distention. Trachea midline. Respiratory:  Normal respiratory effort.  Clear to auscultation, bilaterally without Rales/Wheezes/Rhonchi. Cardiovascular: Regular rate and rhythm with normal S1/S2 without murmurs, rubs or gallops. Brisk capillary refill. 2+ lower extremity pulses (dorsalis pedis). Abdomen: Soft, non-tender, non-distended with normal bowel sounds. Musculoskeletal: No clubbing, cyanosis or edema bilaterally. Full range of motion without deformity of right upper and lower extremities. Contracture noted of L upper extremity due to previous stroke. Brisk capillary refill. 2+ lower extremity pulses (dorsalis pedis). Skin: Normal skin color. No rashes or lesions. Neurologic:  Neurovascularly intact without any focal sensory/motor deficits. Cranial nerves: II-XII intact, grossly non-focal.  Psychiatric: Alert and oriented, thought content appropriate, normal insight          Labs:   No results for input(s): WBC, HGB, HCT, PLT in the last 72 hours. No results for input(s): NA, K, CL, CO2, BUN, CREATININE, CALCIUM, PHOS in the last 72 hours. Invalid input(s): MAGNES  No results for input(s): AST, ALT, BILIDIR, BILITOT, ALKPHOS in the last 72 hours. No results for input(s): INR in the last 72 hours. Recent Labs     05/27/19  1758 05/27/19  2209   TROPONINI <0.01 <0.01       Imaging:  MRI brain without contrast    (Results Pending)   MRA Head WO Contrast    (Results Pending)   MRA Neck WO Contrast    (Results Pending)         Assessment/Plan:  Active Hospital Problems    Diagnosis Date Noted    TIA (transient ischemic attack) [G45.9] 05/27/2019    Essential hypertension [I10] 05/27/2019    HLD (hyperlipidemia) [E78.5] 05/27/2019    COPD (chronic obstructive pulmonary disease) ([facility] [J44.9] 05/27/2019    Cerebrovascular accident (CVA) due to embolism of right posterior [facility] [I63.431]      Plan:  · Await MRI/MRA  · Continue with aspirin, plavix and statin. · Body mass index is 26.54 kg/m².     · DVT Prophylaxis  · Lovenox    · Diet  · DIET CARDIAC;    · Code Status  · Full Code    · PT/OT Eval Status  · Consulted     · Disposition  · Likely home with [address] [facility] - Chief Hospitalist  Please contact me through perfect serve    NOTE: This report was transcribed using voice recognition software. Every effort was made to ensure accuracy; however, inadvertent computerized transcription errors may be present.

## 2019-05-29 LAB
BACTERIA: NORMAL /HPF
BILIRUBIN URINE: NEGATIVE
BLOOD, URINE: NORMAL
CLARITY: CLEAR
COLOR: YELLOW
GLUCOSE URINE: NEGATIVE MG/DL
KETONES, URINE: NEGATIVE MG/DL
LEUKOCYTE ESTERASE, URINE: NEGATIVE
LV EF: 60 %
LVEF MODALITY: NORMAL
NITRITE, URINE: NEGATIVE
PH UA: 6.5 (ref 5–9)
PROTEIN UA: NEGATIVE MG/DL
RBC UA: NORMAL /HPF (ref 0–2)
SPECIFIC GRAVITY UA: <=1.005 (ref 1–1.03)
UROBILINOGEN, URINE: 0.2 E.U./DL
WBC UA: NORMAL /HPF (ref 0–5)

## 2019-05-29 PROCEDURE — 2060000000 HC ICU INTERMEDIATE R&B

## 2019-05-29 PROCEDURE — 94640 AIRWAY INHALATION TREATMENT: CPT

## 2019-05-29 PROCEDURE — 6370000000 HC RX 637 (ALT 250 FOR IP): Performed by: INTERNAL MEDICINE

## 2019-05-29 PROCEDURE — 99232 SBSQ HOSP IP/OBS MODERATE 35: CPT | Performed by: NURSE PRACTITIONER

## 2019-05-29 PROCEDURE — 93005 ELECTROCARDIOGRAM TRACING: CPT | Performed by: INTERNAL MEDICINE

## 2019-05-29 PROCEDURE — 2580000003 HC RX 258: Performed by: INTERNAL MEDICINE

## 2019-05-29 PROCEDURE — 6360000002 HC RX W HCPCS: Performed by: INTERNAL MEDICINE

## 2019-05-29 PROCEDURE — 81001 URINALYSIS AUTO W/SCOPE: CPT

## 2019-05-29 PROCEDURE — 93306 TTE W/DOPPLER COMPLETE: CPT

## 2019-05-29 RX ADMIN — BUDESONIDE 250 MCG: 0.25 SUSPENSION RESPIRATORY (INHALATION) at 19:49

## 2019-05-29 RX ADMIN — FLUTICASONE PROPIONATE 1 SPRAY: 50 SPRAY, METERED NASAL at 09:00

## 2019-05-29 RX ADMIN — DOXAZOSIN 1 MG: 2 TABLET ORAL at 08:57

## 2019-05-29 RX ADMIN — BUDESONIDE 250 MCG: 0.25 SUSPENSION RESPIRATORY (INHALATION) at 09:20

## 2019-05-29 RX ADMIN — METOPROLOL TARTRATE 25 MG: 25 TABLET, FILM COATED ORAL at 08:57

## 2019-05-29 RX ADMIN — AMLODIPINE BESYLATE 2.5 MG: 2.5 TABLET ORAL at 08:57

## 2019-05-29 RX ADMIN — CLOPIDOGREL 75 MG: 75 TABLET, FILM COATED ORAL at 08:57

## 2019-05-29 RX ADMIN — ENOXAPARIN SODIUM 40 MG: 40 INJECTION SUBCUTANEOUS at 08:57

## 2019-05-29 RX ADMIN — CETIRIZINE HYDROCHLORIDE 10 MG: 10 TABLET, FILM COATED ORAL at 08:57

## 2019-05-29 RX ADMIN — ISOSORBIDE MONONITRATE 60 MG: 60 TABLET ORAL at 08:57

## 2019-05-29 RX ADMIN — LEVETIRACETAM 500 MG: 500 TABLET ORAL at 21:18

## 2019-05-29 RX ADMIN — Medication 10 ML: at 21:18

## 2019-05-29 RX ADMIN — ASPIRIN 81 MG: 81 TABLET ORAL at 08:57

## 2019-05-29 RX ADMIN — ROSUVASTATIN CALCIUM 10 MG: 10 TABLET, FILM COATED ORAL at 08:57

## 2019-05-29 RX ADMIN — FORMOTEROL FUMARATE DIHYDRATE 20 MCG: 20 SOLUTION RESPIRATORY (INHALATION) at 09:20

## 2019-05-29 RX ADMIN — LEVETIRACETAM 500 MG: 500 TABLET ORAL at 08:57

## 2019-05-29 RX ADMIN — Medication 100 MG: at 08:57

## 2019-05-29 RX ADMIN — FORMOTEROL FUMARATE DIHYDRATE 20 MCG: 20 SOLUTION RESPIRATORY (INHALATION) at 19:49

## 2019-05-29 RX ADMIN — MAGNESIUM HYDROXIDE 30 ML: 400 SUSPENSION ORAL at 13:15

## 2019-05-29 NOTE — PROGRESS NOTES
Hospitalist Progress Note      PCP: Oneyda Sihpman    Date of Admission: 5/27/2019  Days in the hospital: 2    Chief Complaint: TIA     Hospital Course:     Seen by neurology. MRI of brain and MRA of head and neck performed. Found to have an acute ischemic event in the right temporal and occipital lobes. Electrophysiology consulted for possible cardioembolic source of stroke. Subjective  Patient seen and examined at bedside. Patient states that he is feeling better today  Patient denies any fevers, chills, chest pain, shortness of breath, nausea, vomiting. Wants to go home. Medications:  Reviewed    Infusion Medications   Scheduled Medications    sodium chloride flush  10 mL Intravenous 2 times per day    enoxaparin  40 mg Subcutaneous Daily    aspirin  81 mg Oral Daily    amLODIPine  2.5 mg Oral Daily    budesonide  250 mcg Nebulization BID    clopidogrel  75 mg Oral Daily    cetirizine  10 mg Oral Daily    fluticasone  1 spray Each Nostril Daily    formoterol  20 mcg Nebulization BID    isosorbide mononitrate  60 mg Oral Daily    levETIRAcetam  500 mg Oral BID    metoprolol tartrate  25 mg Oral BID    pantoprazole  40 mg Oral Daily    rosuvastatin  10 mg Oral Daily    doxazosin  1 mg Oral Daily    vitamin B-1  100 mg Oral Daily     PRN Meds: sodium chloride flush, magnesium hydroxide, ondansetron, perflutren lipid microspheres, acetaminophen, labetalol      Intake/Output Summary (Last 24 hours) at 5/29/2019 1220  Last data filed at 5/29/2019 0931  Gross per 24 hour   Intake 760 ml   Output 300 ml   Net 460 ml       Exam:    BP (!) 110/58   Pulse 78   Temp 98.8 °F (37.1 °C) (Temporal)   Resp 16   Ht 5' 10\" (1.778 m)   Wt 185 lb (83.9 kg)   SpO2 92%   BMI 26.54 kg/m²     General appearance: No apparent distress, appears stated age and cooperative. HEENT: Normal cephalic, atraumatic without obvious deformity. Pupils equal, round, and reactive to light.   Extra ocular muscles intact. Conjunctivae/corneas clear. Neck: Supple, with full range of motion. No jugular venous distention. Trachea midline. Respiratory:  Normal respiratory effort. Clear to auscultation, bilaterally without Rales/Wheezes/Rhonchi. Cardiovascular: Regular rate and rhythm with normal S1/S2 without murmurs, rubs or gallops. Brisk capillary refill. 2+ lower extremity pulses (dorsalis pedis). Abdomen: Soft, non-tender, non-distended with normal bowel sounds. Musculoskeletal: No clubbing, cyanosis or edema bilaterally. Full range of motion without deformity of right upper and lower extremities. Contracture noted of L upper extremity due to previous stroke. Brisk capillary refill. 2+ lower extremity pulses (dorsalis pedis). Skin: Normal skin color. No rashes or lesions. Neurologic:  Neurovascularly intact without any focal sensory/motor deficits. Cranial nerves: II-XII intact, grossly non-focal.  Psychiatric: Alert and oriented, thought content appropriate, normal insight          Labs:   No results for input(s): WBC, HGB, HCT, PLT in the last 72 hours. Recent Labs     05/28/19  0956      K 4.2   CL 94*   CO2 25   BUN 16   CREATININE 1.6*   CALCIUM 9.0     No results for input(s): AST, ALT, BILIDIR, BILITOT, ALKPHOS in the last 72 hours. No results for input(s): INR in the last 72 hours. Recent Labs     05/27/19  1758 05/27/19  2209   TROPONINI <0.01 <0.01       Imaging:  MRI brain without contrast   Final Result      Acute ischemic event involving the medial aspect of the right temporal   and occipital lobes in the distribution of the terminal branches of   the right posterior cerebral artery. Cortical atrophy and chronic periventricular microangiopathy are   present. Remote infarct or trauma to the left parietal lobe. Mastoid sinusitis. MRA Neck WO Contrast   Final Result      Negative MRA of the neck.       MRA Head WO Contrast   Final Result      Negative MRA of the [place]. XR CHEST 1 VW   Final Result   No evidence of acute cardiopulmonary pathology. No prosthetic valve or contraindication to MRI identified. Assessment/Plan:  Active Hospital Problems    Diagnosis Date Noted    TIA (transient ischemic attack) [G45.9] 05/27/2019    Essential hypertension [I10] 05/27/2019    HLD (hyperlipidemia) [E78.5] 05/27/2019    COPD (chronic obstructive pulmonary disease) ([facility] [facility] [J44.9] 05/27/2019    Cerebrovascular accident (CVA) due to embolism of right posterior cerebral artery ([facility] [address] [I63.431]      Plan:  · Found to have acute ischemic infarction in the right temporal and occipital lobes. · Electrophysiology consulted  · Discussed case with neurology  · Continue with aspirin, plavix and statin. Body mass index is 26.54 kg/m². · DVT Prophylaxis  · Lovenox    · Diet  DIET CARDIAC; Diet NPO, After Midnight    · Code Status  Full Code    · PT/OT Eval Status  · Consulted     · Disposition  · Likely home with [address] [facility] - Chief Hospitalist  Please contact me through perfect serve    NOTE: This report was transcribed using voice recognition software. Every effort was made to ensure accuracy; however, inadvertent computerized transcription errors may be present.

## 2019-05-29 NOTE — CARE COORDINATION
5/29/2019  Met with patient to obtain information and assist with potential discharge needs. Patient lives with his wife and son, one floor home with ramped entrance. Patient has a walker, however relayed he uses 2 sp canes to assist with ambulation. Patient also has home O2, uses 4 liters nc at night. Discussed home health and patient decline. Patient reported he had home health in the past and did not feel it was needed. No needs identified at this time.

## 2019-05-29 NOTE — PROGRESS NOTES
Josse Stringer is a 74 y.o. right handed male     Neurology following for stroke    Family at bedside    Hx of multiple strokes (25 in the past), renal cancer, MI, lung cancer, COPD, seizures, HLD, HTN    Presented with vision problems from another hospital  ---he could not tell the time due to his vision  ---no other deficits  ---on DAPT for her heart and takes Crestor 10 mg    Spoke with wife at bedside  ---pt has had multiple strokes with no identification as to causes of his previous strokes  ---he has had 30 day heart monitor with no Afib found  ---wife says Afib has been mentioned before and he failed with coumadin but she cannot recall if he was on it for his heart   ---was seen at [facility] beginning of this year for multiple strokes    ---still no cause was found of these strokes     Spoke with Dr. Mayra Corado regarding this pt and he would like pt worked up for CE possible Afib   ---EP consulted for eval of telemetry as Dr. Mayra Corado believes pt was in Afib   ---would like LINQ if Afib not on telemetry   ---pt has been missed as to what is causing these strokes   ---discussed this with pt, wife and son     Has some loss of vision in his left eye     No chest pain or palpitations  No coughing or wheezing    No vertigo, lightheadedness or loss of consciousness  No falls, tripping or stumbling  No incontinence of bowels or bladder  No itching or bruising appreciated  No numbness, tingling or focal arm/leg weakness    ROS otherwise negative     Objective:     BP (!) 110/58   Pulse 78   Temp 98.8 °F (37.1 °C) (Temporal)   Resp 16   Ht 5' 10\" (1.778 m)   Wt 185 lb (83.9 kg)   SpO2 92%   BMI 26.54 kg/m²     General: Patient lying bed in NAD. Appears well nourished and well developed. Head:  Normocephalic and atraumatic  Eyes: Sclera white, conjunctiva pink  Neck:  Trachea midline.  Neck supple and normal ROM, no bruits, no lymphadenopathy  Lungs: Clear to auscultation bilaterally, respirations unlabored  Heart: Regular rate and rhythm  Abdomen: Soft with hyperactive bowel sounds in all quadrants   Extremities: No edema to BLE, +2 pulses bilaterally  Skin: No lesions or rashes     Mental Status: Alert and oriented x 3. Follows all commands.     Poor attention/concentration---could be due to [facility]   Intact fundus of knowledge  Repetition not intact--was able to recall 3 items immediately but not after 3 min  Intact memories      Speech:no dysarthria  Language:no aphasias     Cranial Nerves:  I: smell NA   II: visual acuity  NA   II: visual fields Left upper quadrantanopsia    II: pupils JULIANA   III,VII: ptosis None   III,IV,VI: extraocular muscles  Full ROM   V: mastication Normal   V: facial light touch sensation  Normal   V,VII: corneal reflex     VII: facial muscle function - upper  Normal   VII: facial muscle function - lower Right droop    VIII: hearing Normal   IX: soft palate elevation  Normal   IX,X: gag reflex    XI: trapezius strength  5/5   XI: sternocleidomastoid strength 5/5   XI: neck extension strength  5/5   XII: tongue strength  Normal     Motor:  5/5 strength throughout  No abnormal movements or tremors  Normal muscle tone and bulk  No drift     Sensory:  LT, PP, and vibration intact except for decreased PP to LLE    Coordination:   FNF, HS, and FFM intact bilaterally     DTR:   +1 throughout     Right Babinskis  No Thomason's    No pathological reflexes    Laboratory/Radiology:     CMP:    Lab Results   Component Value Date     05/28/2019    K 4.2 05/28/2019    CL 94 05/28/2019    CO2 25 05/28/2019    BUN 16 05/28/2019    CREATININE 1.6 05/28/2019    GFRAA 51 05/28/2019    LABGLOM 42 05/28/2019    GLUCOSE 123 05/28/2019    CALCIUM 9.0 05/28/2019     HgBA1c:    Lab Results   Component Value Date    LABA1C 5.2 05/28/2019     FLP:    Lab Results   Component Value Date    TRIG 105 05/28/2019    HDL 57 05/28/2019    LDLCALC 48 05/28/2019    LABVLDL 21 05/28/2019     CTH from other facility I did not personally review. MRI brain: acute right temporal and occipital lobe infarcts    MRA head/neck: negative for stenosis or occlusions     All labs and imaging studies reviewed at this time.     Assessment:     Acute right temporal and occipital lobe infarcts   ---right PCA distribution  ---etiology possible CE source   ---possible Afib on telemetry    Neuro assessment pt with left upper quadrant-opsia   ---no other deficits seen in my exam     Plan:     Pending EP consult   ---telemetry to confirm if pt truly in Afib  ARLETTE  ---if neg please implant LINQ  ---possible CE source of his infarcts  NPO after midnight  Continue Aspirin 81 mg and Plavix 75 mg  Continue Crestor 10 mg  Continue Keppra 500 mg BID   PT/OT  Stroke education  Stroke Clinic follow up once discharged  Neurology to follow       REYNALDO Rapp, CNP  12:21 PM  5/29/2019

## 2019-05-29 NOTE — PROGRESS NOTES
[facility] Cardiac Electrophysiology    For consult not needed. Telemetry reviewed: Sinus rhythm with no evidence of atrial fibrillation. The patient is to be scheduled for ARLETTE and will plan for ILR implantation if ARLETTE is unremarkable. Thank you.     Marv Medeiros DO  [facility] Cardiac Electrophysiology  [address] [facility]

## 2019-05-30 ENCOUNTER — ANESTHESIA EVENT (OUTPATIENT)
Dept: CARDIAC CATH/INVASIVE PROCEDURES | Age: 74
DRG: 042 | End: 2019-05-30
Payer: MEDICARE

## 2019-05-30 ENCOUNTER — ANESTHESIA (OUTPATIENT)
Dept: CARDIAC CATH/INVASIVE PROCEDURES | Age: 74
DRG: 042 | End: 2019-05-30
Payer: MEDICARE

## 2019-05-30 VITALS
RESPIRATION RATE: 18 BRPM | SYSTOLIC BLOOD PRESSURE: 135 MMHG | BODY MASS INDEX: 26.48 KG/M2 | HEIGHT: 70 IN | DIASTOLIC BLOOD PRESSURE: 69 MMHG | HEART RATE: 86 BPM | WEIGHT: 185 LBS | OXYGEN SATURATION: 95 % | TEMPERATURE: 98.2 F

## 2019-05-30 VITALS — SYSTOLIC BLOOD PRESSURE: 136 MMHG | OXYGEN SATURATION: 99 % | DIASTOLIC BLOOD PRESSURE: 61 MMHG

## 2019-05-30 LAB
EKG ATRIAL RATE: 71 BPM
EKG P AXIS: 30 DEGREES
EKG P-R INTERVAL: 190 MS
EKG Q-T INTERVAL: 418 MS
EKG QRS DURATION: 92 MS
EKG QTC CALCULATION (BAZETT): 454 MS
EKG R AXIS: -37 DEGREES
EKG T AXIS: 48 DEGREES
EKG VENTRICULAR RATE: 71 BPM

## 2019-05-30 PROCEDURE — 93321 DOPPLER ECHO F-UP/LMTD STD: CPT

## 2019-05-30 PROCEDURE — 2580000003 HC RX 258: Performed by: NURSE ANESTHETIST, CERTIFIED REGISTERED

## 2019-05-30 PROCEDURE — 94640 AIRWAY INHALATION TREATMENT: CPT

## 2019-05-30 PROCEDURE — 0JH602Z INSERTION OF MONITORING DEVICE INTO CHEST SUBCUTANEOUS TISSUE AND FASCIA, OPEN APPROACH: ICD-10-PCS | Performed by: INTERNAL MEDICINE

## 2019-05-30 PROCEDURE — 33285 INSJ SUBQ CAR RHYTHM MNTR: CPT | Performed by: INTERNAL MEDICINE

## 2019-05-30 PROCEDURE — 2709999900 HC NON-CHARGEABLE SUPPLY

## 2019-05-30 PROCEDURE — 97535 SELF CARE MNGMENT TRAINING: CPT

## 2019-05-30 PROCEDURE — 93010 ELECTROCARDIOGRAM REPORT: CPT | Performed by: INTERNAL MEDICINE

## 2019-05-30 PROCEDURE — 97530 THERAPEUTIC ACTIVITIES: CPT

## 2019-05-30 PROCEDURE — 93325 DOPPLER ECHO COLOR FLOW MAPG: CPT

## 2019-05-30 PROCEDURE — 6360000002 HC RX W HCPCS: Performed by: NURSE ANESTHETIST, CERTIFIED REGISTERED

## 2019-05-30 PROCEDURE — C1764 EVENT RECORDER, CARDIAC: HCPCS

## 2019-05-30 PROCEDURE — 6360000002 HC RX W HCPCS: Performed by: INTERNAL MEDICINE

## 2019-05-30 PROCEDURE — 93312 ECHO TRANSESOPHAGEAL: CPT

## 2019-05-30 RX ORDER — SODIUM CHLORIDE 9 MG/ML
INJECTION, SOLUTION INTRAVENOUS CONTINUOUS
Status: DISCONTINUED | OUTPATIENT
Start: 2019-05-30 | End: 2019-05-30 | Stop reason: HOSPADM

## 2019-05-30 RX ORDER — PROPOFOL 10 MG/ML
INJECTION, EMULSION INTRAVENOUS PRN
Status: DISCONTINUED | OUTPATIENT
Start: 2019-05-30 | End: 2019-05-30 | Stop reason: SDUPTHER

## 2019-05-30 RX ORDER — SODIUM CHLORIDE 9 MG/ML
INJECTION, SOLUTION INTRAVENOUS CONTINUOUS PRN
Status: DISCONTINUED | OUTPATIENT
Start: 2019-05-30 | End: 2019-05-30 | Stop reason: SDUPTHER

## 2019-05-30 RX ADMIN — FORMOTEROL FUMARATE DIHYDRATE 20 MCG: 20 SOLUTION RESPIRATORY (INHALATION) at 11:27

## 2019-05-30 RX ADMIN — BUDESONIDE 250 MCG: 0.25 SUSPENSION RESPIRATORY (INHALATION) at 11:28

## 2019-05-30 RX ADMIN — PROPOFOL 200 MG: 10 INJECTION, EMULSION INTRAVENOUS at 14:18

## 2019-05-30 RX ADMIN — SODIUM CHLORIDE: 9 INJECTION, SOLUTION INTRAVENOUS at 14:11

## 2019-05-30 ASSESSMENT — PAIN SCALES - GENERAL
PAINLEVEL_OUTOF10: 0

## 2019-05-30 ASSESSMENT — LIFESTYLE VARIABLES: SMOKING_STATUS: 0

## 2019-05-30 NOTE — ANESTHESIA PRE PROCEDURE
nebulizer solution Take 20 mcg by nebulization 2 times daily   Yes Historical Provider, MD   OXYGEN 3-4 L/day by Other route nightly as needed (As needed for shortness of breath and decreased SpO2)   Yes Historical Provider, MD       Current medications:    Current Facility-Administered Medications   Medication Dose Route Frequency Provider Last Rate Last Dose    0.9 % sodium chloride infusion   Intravenous Continuous Elease Him, DO        sodium chloride flush 0.9 % injection 10 mL  10 mL Intravenous 2 times per day Desmondmauricio Cazares, DO   10 mL at 05/29/19 2118    sodium chloride flush 0.9 % injection 10 mL  10 mL Intravenous PRN Desmond Debora, DO        magnesium hydroxide (MILK OF MAGNESIA) 400 MG/5ML suspension 30 mL  30 mL Oral Daily PRN Desmond Debora, DO   30 mL at 05/29/19 1315    ondansetron (ZOFRAN) injection 4 mg  4 mg Intravenous Q6H PRN Desmond Debora, DO        enoxaparin (LOVENOX) injection 40 mg  40 mg Subcutaneous Daily Desmond Debora, DO   40 mg at 05/29/19 0857    aspirin EC tablet 81 mg  81 mg Oral Daily Desmond Debora, DO   Stopped at 05/30/19 8539    perflutren lipid microspheres (DEFINITY) injection 1.65 mg  1.5 mL Intravenous ONCE PRN Desmond Debora, DO        acetaminophen (TYLENOL) tablet 650 mg  650 mg Oral Q4H PRN Desmond Debora, DO        labetalol (NORMODYNE;TRANDATE) injection 10 mg  10 mg Intravenous Q10 Min PRN Desmond Debora, DO        amLODIPine (NORVASC) tablet 2.5 mg  2.5 mg Oral Daily Desmond Debora, DO   Stopped at 05/30/19 0734    budesonide (PULMICORT) nebulizer suspension 250 mcg  250 mcg Nebulization BID Desmond Debora, DO   250 mcg at 05/30/19 1128    clopidogrel (PLAVIX) tablet 75 mg  75 mg Oral Daily Desmond Debora, DO   Stopped at 05/30/19 0734    cetirizine (ZYRTEC) tablet 10 mg  10 mg Oral Daily Desmond Debora, DO   10 mg at 05/29/19 0857    fluticasone (FLONASE) 50 MCG/ACT nasal spray 1 spray  1 spray Each Nostril Daily Desmond Cazares, DO   1 spray at 05/29/19 0900    formoterol (PERFOROMIST) nebulizer solution 20 mcg  20 mcg Nebulization BID Isabella Rile, DO   20 mcg at 05/30/19 1127    isosorbide mononitrate (IMDUR) extended release tablet 60 mg  60 mg Oral Daily Isabella Rile, DO   Stopped at 05/30/19 6955    levETIRAcetam (KEPPRA) tablet 500 mg  500 mg Oral BID Isabella Rile, DO   500 mg at 05/29/19 2118    metoprolol tartrate (LOPRESSOR) tablet 25 mg  25 mg Oral BID Isabella Rile, DO   Stopped at 05/30/19 0735    pantoprazole (PROTONIX) tablet 40 mg  40 mg Oral Daily Isabella Cale, DO   40 mg at 05/28/19 0662    rosuvastatin (CRESTOR) tablet 10 mg  10 mg Oral Daily Isabella Rile, DO   Stopped at 05/30/19 4144    doxazosin (CARDURA) tablet 1 mg  1 mg Oral Daily Isabella Mccalle, DO   Stopped at 05/30/19 9583    vitamin B-1 (THIAMINE) tablet 100 mg  100 mg Oral Daily Isabella Rile, DO   Stopped at 05/30/19 6677       Allergies: Allergies   Allergen Reactions    Morphine Anaphylaxis    Lipitor [Atorvastatin Calcium]      Hallucinations    Prednisone     Cheese     Adhesive Tape     Neosporin [Neomycin-Polymyxin-Gramicidin] Rash       Problem List:    Patient Active Problem List   Diagnosis Code    TIA (transient ischemic attack) G45.9    Essential hypertension I10    HLD (hyperlipidemia) E78.5    COPD (chronic obstructive pulmonary disease) ([facility] [address] J44.9    Cerebrovascular accident (CVA) due to embolism of right posterior cerebral artery ([address] K14.270       Past Medical History:        Diagnosis Date    COPD (chronic obstructive pulmonary disease) (HCC)     Lung cancer (HCC)     MI (myocardial infarction) ([facility] [address]     History of 4 MIs.      Renal cancer ([address]        Past Surgical History:        Procedure Laterality Date    CAROTID ENDARTERECTOMY Left     CORONARY ANGIOPLASTY WITH STENT PLACEMENT      5 stents total    CORONARY ARTERY BYPASS GRAFT      4 vessels    LUNG REMOVAL, PARTIAL      left upper lung removal       Social COPD:      (-) not a current smoker (FORMER)                          ROS comment: PARTIAL LUNG REMOVAL   Cardiovascular:    (+) hypertension:, past MI:, CABG/stent (cabg, stent):,         Rhythm: regular  Rate: normal                    Neuro/Psych:   (+) CVA:, TIA,             GI/Hepatic/Renal:             Endo/Other:    (+) blood dyscrasia: anticoagulation therapy:., malignancy/cancer (LUNG, RENAL). Abdominal:   (+) obese,     Abdomen: soft. Vascular:           ROS comment: ANEURYSM R CEREBRAL ARTERY  CEA. Patient MRN: [id number]   : 1945   Age:  74 years   Gender: Male   Order Date: 2019 5:30 PM       Exam: XR CHEST 1 VIEW       Number of Images: 1 view       Indication:   MRI preprocedure clearance evaluation       Comparison: None. Findings: The lungs are symmetrically expanded, and are clear. There is no   evidence of pneumothorax or pleural effusion. Cardiovascular shadows are normal in appearance. There is no evidence   of cardiac enlargement or decompensation. Skeletal structures show left chest wall deformity consistent with   prior surgery or trauma and subsequent healing in deformity of the   posterior sixth left rib. Overlying EKG leads are present. .           Impression   No evidence of acute cardiopulmonary pathology. No prosthetic valve or contraindication to MRI identified. 3 dimensional time-of-flight magnetic resonance angiography of the   brain was performed without contrast. 3-D postprocessing. 3-dimensional reconstructed images of the arterial tree. MIP imaging. FINDINGS:       There is no evidence for aneurysm or stenosis within the distal   internal carotid, distal vertebral, the basilar, anterior, middle, and   posterior cerebral arteries and their respective visualized branches. Impression       Negative MRA of the [place].             3-dimensional time-of-flight magnetic resonance angiography of the   neck was performed without contrast. 3-D post processing. 3-dimensional reconstructions of the arterial tree. MIP imaging. FINDINGS:       There is no evidence for stenosis or aneurysm within the common   carotid, vertebral, internal carotid or external carotid arteries or   their respective visualized branches. Impression       Negative MRA of the neck. Multiplanar multisequence MRI of the brain was performed without   contrast.       COMPARISON: None. On the diffusion-weighted sequence, there is severely increased signal   intensity within the medial aspect of the right temporal and occipital   lobes in the right posterior cerebral artery terminal branch   distributions. These areas are hypointense on T1 and hyperintense on   T2. No other diffusion restriction. There is enlargement of the ventricles, sulci and cisterns   bilaterally. There is patchy T2 hyperintensity in the periventricular   deep white matter bilaterally. Encephalomalacia and gliosis within the   left parietal lobe from remote infarct or trauma. Mucosal thickening   within the paranasal sinuses but no fluid levels are evident. There is   fluid in the mastoid air cells. Otherwise the brain parenchyma, CSF spaces, paranasal sinuses and   mastoid air cells and surrounding soft tissue and osseous structures   have a satisfactory appearance. Impression       Acute ischemic event involving the medial aspect of the right temporal   and occipital lobes in the distribution of the terminal branches of   the right posterior cerebral artery. Cortical atrophy and chronic periventricular microangiopathy are   present. Remote infarct or trauma to the left parietal lobe. Mastoid sinusitis.      Narrative   Performed by: [facility]   Normal sinus rhythm  Left axis deviation  Low voltage QRS  Incomplete right bundle branch block  Inferior infarct , age undetermined  Abnormal ECG  No previous ECGs available  Confirmed by Vikram Esteves ([id number]) on 5/30/2019 12:58:03 PM     Echo Complete   Order: [id number]   Status:  Final result   Visible to patient:  No (Not Released) Next appt: Today at 02:30 PM in IP Unit [facility] SPECIAL PROCEDURES LAB)   Details     Reading Physician Reading Date Result Priority   Minnie Guardado DO 5/29/2019       Narrative     Transthoracic Echocardiography Report (TTE)     Demographics      Patient Name    Longoria Rolling      Gender            Male                   [address] Record  [id number]      Room Number       8502   Number      Account #       [de-identified]     Procedure Date    05/29/2019      Corporate ID                  Ordering          Darryle Carson DO                                 Physician      Accession       [id number]     Referring   Number                        Physician      Date of Birth   1945 [address]      Age             74 year(s)    Interpreting      [address]                                 Physician         Physician Cardiology                                                   Kathleen Wagner DO                                    Any Other     Procedure    Type of Study      TTE procedure:Echo Complete W/Doppler & Color Flow. Procedure Date  Date: 05/29/2019 Start: 07:16 AM    Study Location: Echo Lab  Technical Quality: Adequate visualization    Indications:CVA. Patient Status: Routine    Contrast Medium: Bubble Study. Height: 70 inches Weight: 185 pounds BSA: 2.02 m^2 BMI: 26.54 kg/m^2    Rhythm: Within normal limits BP: 112/58 mmHg     Findings      Left Ventricle   Ejection fraction is visually estimated at 60%. No regional wall motion   abnormalities seen. Mild left ventricular concentric hypertrophy noted. Left ventricle size is normal. Normal left ventricular diastolic filling   pattern for age.       Right Ventricle   Normal right ventricle structure

## 2019-05-30 NOTE — PROGRESS NOTES
Discussed the use on implantable loop reocorder and long term rhythm monitoring with both the patient and his wife both are in agreement to proceed. All risk benefits and alternatives discussed.

## 2019-05-30 NOTE — PROGRESS NOTES
education  Pt educated on hand placement during sit to stand transfer. Patient response to education:   Pt verbalized understanding Pt demonstrated skill Pt requires further education in this area   Yes With assistance Yes     Comments:  Pt found in bed and agreed to tx. Pt cleared by nursing prior to tx. Pt's O2 on RA at rest 96%. Pt required Hailey for bed mobility. Pt sat EOB for 3 minutes. Pt amb HHA into bathroom. Pt washed face and combed hair standing at sink. Pt sat in chair and donned shoes. Pt amb with ww 100 feet x1 Hailey. Cues to keep inside ww. Pt returned to sit in chair and performed ex. Pt agreed to cont to sit in chair. Pt given call light.       Time in: 1000  Time out: Brenda 39 [id number]

## 2019-05-30 NOTE — OP NOTE
[address] [facility] Electrophysiology  Procedure Report  PATIENT: Kaitlynn Lal  MEDICAL RECORD NUMBER: [id number]  DATE OF PROCEDURE:  5/30/2019  ATTENDING ELECTROPHYSIOLOGIST:  Avtar Storm MD  REFERRING PHYSICIAN: Dr. Rosaura Clark:   1. Reveal LINQ Insertable Cardiac Monitor Implantation     INDICATION:   Cryptogenic CVA    PROCEDURE PERFORMED BY: Avtar Storm MD    ASSISTANT: None     ESTIMATED BLOOD LOSS: Approximately <10 cc     PROCEDURE TIME: 15 minutes    COMPLICATIONS: None immediately apparent    DESCRIPTION OF PROCEDURE: The risks, benefits, and alternatives to the procedure were discussed with the patient, and informed consent was obtained. The patient was brought to the Electrophysiology lab and after postioning the patient and prepping and draping a sterile field, the region lateral to the sternum, was liberally infiltrated with 2% Lidocaine. Using the skin blade, a small nick was made. Using the introducer kit, the device was deployed into the subcutaneous space. Hemostasis was achieved with brief manual pressure. The incision was closed in 1 layer including using 4-0 Vicryl. The wound was dressed with steri-strips and an op site dressing. Excellent P wave and QRS sensing were obtained. The patient was transferred to the recovery room in stable condition. DEVICE INFORMATION: The ICM is a [employer] Linq, model #: [id number], serial #: [id number]     SUMMARY:   1. Successful implantation of a  Reveal LINQ insertable cardiac monitor. RECOMMENDATIONS:   1. OK to discharge to home per EP perspective when OK with others. 2. Follow-up in the office in 2 weeks for a postoperative incision check.   See discharge instructions for details    Avtar Storm MD  Cardiac Electrophysiology  [facility]) [facility]  [facility] [place]: [facility] Elect[facility]  3:15 PM  5/30/2019

## 2019-05-30 NOTE — PROGRESS NOTES
OT BEDSIDE TREATMENT NOTE      Date:2019  Patient Name: Calos Pool  MRN: [id number]  : 1945  Room: [address] [place] [address]     Per OT Eval:    Evaluating OT: Med Mcguire. Belén Luevano OTR/L #[id number]     AM-PAC Daily Activity Raw Score:   Modified Cassandra Scale (MRS)  Score     Description  0             No symptoms  1             No significant disability despite symptoms  2             Slight disability; able to look after own affairs  3             Moderate disability; able to ambulate without assist/ requires assist with ADLs  4             Moderate/Severe disability;requires assist to ambulate/assist with ADLs  5             Severe disability;bedridden/incontinent   6               Score:   4     Recommended Adaptive Equipment: built up feeding utencil     Diagnosis: TIA   Patient Active Problem List   Diagnosis    TIA (transient ischemic attack)    Essential hypertension    HLD (hyperlipidemia)    [facility] disease) [facility]    Cerebrovascular accident (CVA) due to embolism of right posterior cerebral artery ([facility]      Surgery: CABG, partial lung removal     Pertinent Medical History:   Past Medical History        Past Medical History:   Diagnosis Date    COPD (chronic obstructive pulmonary disease) ([facility]      Lung cancer ([facility]      MI (myocardial infarction) ([facility]       History of 4 MIs.  Renal cancer ([facility]           Precautions:  Falls, , decreased peripheral vision to R , patient states seizures     Home Living: Pt lives with wife and son- primary CG in a one story home  with no step(s) to enter and no rail(s); ramped entry bed/bath on main floor   Bathroom setup: walk in shower with seat and standard commode with HR   Equipment owned: w/c , ramp, shower seat, AE for LE dressing and bathing  Prior Level of Function:   Assisted as needed with ADLs ,  assisted with IADLs; using 2 canes for in home and w/c for community for ambulation.    Driving: no

## 2019-05-30 NOTE — ANESTHESIA POSTPROCEDURE EVALUATION
Department of Anesthesiology  Postprocedure Note    Patient: Den Lay  MRN: [id number]  YOB: 1945  Date of evaluation: 5/30/2019  Time:  7:33 PM     Procedure Summary     Date:  05/30/19 Room / Location:  [facility] CATH LAB; [facility] ECHO    Anesthesia Start:  9750 Anesthesia Stop:  3415    Procedure:  SEY ARLETTE Diagnosis:      Scheduled Providers:   Responsible Provider:  Nora Workman DO    Anesthesia Type:  MAC ASA Status:  4          Anesthesia Type: MAC    Ramiro Phase I:      Ramiro Phase II:      Last vitals: Reviewed and per EMR flowsheets.        Anesthesia Post Evaluation    Patient location during evaluation: bedside  Patient participation: complete - patient cannot participate  Level of consciousness: awake and alert  Airway patency: patent  Nausea & Vomiting: no nausea and no vomiting  Complications: no  Cardiovascular status: blood pressure returned to baseline  Respiratory status: acceptable  Hydration status: euvolemic

## 2019-06-12 NOTE — DISCHARGE SUMMARY
Hospital Medicine Discharge Summary    Patient ID: Riley Ocasio      Patient's PCP: Nicolette Noriega    Admit Date: 5/27/2019     Discharge Date: 5/30/2019      Admitting Physician: Destinee Bacon DO     Discharge Physician: Jeet Alejandro MD     Discharge Diagnoses: Active Hospital Problems    Diagnosis Date Noted    TIA (transient ischemic attack) [G45.9] 05/27/2019    Essential hypertension [I10] 05/27/2019    HLD (hyperlipidemia) [E78.5] 05/27/2019    COPD (chronic obstructive pulmonary disease) ([facility] [J44.9] 05/27/2019    Cerebrovascular accident (CVA) due to embolism of right posterior cerebral artery ([facility] [address] [I63.431]        The patient was seen and examined on day of discharge and this discharge summary is in conjunction with any daily progress note from day of discharge. Hospital Course: Pt admitted for concern for TIA and stroke like symptoms. Seen by neurology. MRI of brain and MRA of head and neck performed. Found to have an acute ischemic event in the right temporal and occipital lobes. Electrophysiology consulted for possible cardioembolic source of stroke and had LINQ placed on 5/30. Pt to continue aspirin, plavix, crestor, keppra. Was deemed stable for dc home with [facility] and cardiology and stroke clinic follow up. Exam:     /69   Pulse 86   Temp 98.2 °F (36.8 °C) (Temporal)   Resp 18   Ht 5' 10\" (1.778 m)   Wt 185 lb (83.9 kg)   SpO2 95%   BMI 26.54 kg/m²     General appearance: No apparent distress, appears stated age and cooperative. HEENT: Pupils equal, round, and reactive to light. Conjunctivae/corneas clear. Neck: Supple, with full range of motion. No jugular venous distention. Trachea midline. Respiratory:  Normal respiratory effort. Clear to auscultation, bilaterally without Rales/Wheezes/Rhonchi. Cardiovascular: Regular rate and rhythm with normal S1/S2 without murmurs, rubs or gallops.   Abdomen: Soft, non-tender, non-distended with normal bowel sounds. Musculoskeletal: No clubbing, cyanosis or edema bilaterally. Full range of motion without deformity. Skin: Skin color, texture, turgor normal.  No rashes or lesions. Neurologic:  Neurovascularly intact without any focal sensory/motor deficits. Cranial nerves: II-XII intact, grossly non-focal.  Psychiatric: Alert and oriented, thought content appropriate, normal insight      Consults:     IP CONSULT TO NEUROLOGY  IP CONSULT TO ELECTROPHYSIOLOGY    Significant Diagnostic Studies:    MRI brain without contrast   Final Result      Acute ischemic event involving the medial aspect of the right temporal   and occipital lobes in the distribution of the terminal branches of   the right posterior cerebral artery. Cortical atrophy and chronic periventricular microangiopathy are   present. Remote infarct or trauma to the left parietal lobe. Mastoid sinusitis. MRA Neck WO Contrast   Final Result      Negative MRA of the neck. MRA Head WO Contrast   Final Result      Negative MRA of the [place]. XR CHEST 1 VW   Final Result   No evidence of acute cardiopulmonary pathology. No prosthetic valve or contraindication to MRI identified. Disposition:  Home with [facility]     Discharge Instructions/Follow-up:  Keep scheduled follow up appointments. Take medications as prescribed     Code Status:  Prior     Activity: activity as tolerated    Diet: cardiac diet    Labs:  For convenience and continuity at follow-up the following most recent labs are provided:      CBC:  No results found for: WBC, HGB, HCT, PLT    Renal:    Lab Results   Component Value Date     05/28/2019    K 4.2 05/28/2019    CL 94 05/28/2019    CO2 25 05/28/2019    BUN 16 05/28/2019    CREATININE 1.6 05/28/2019    CALCIUM 9.0 05/28/2019       Discharge Medications:     Discharge Medication List as of 5/30/2019  5:06 PM           Details   metoprolol tartrate (LOPRESSOR) 25 MG tablet Take 0.5 tablets by mouth 2 times daily, Disp-60 tablet, R-3Normal              Details   clopidogrel (PLAVIX) 75 MG tablet Take 75 mg by mouth dailyHistorical Med      pantoprazole (PROTONIX) 40 MG tablet Take 40 mg by mouth dailyHistorical Med      levETIRAcetam (KEPPRA) 500 MG tablet Take 500 mg by mouth 2 times dailyHistorical Med      ezetimibe (ZETIA) 10 MG tablet Take 10 mg by mouth dailyHistorical Med      terazosin (HYTRIN) 1 MG capsule Take 1 mg by mouth 2 times dailyHistorical Med      aspirin 81 MG chewable tablet Take 81 mg by mouth dailyHistorical Med      fluticasone (FLONASE) 50 MCG/ACT nasal spray 1 spray by Each Nostril route dailyHistorical Med      isosorbide mononitrate (IMDUR) 60 MG extended release tablet Take 60 mg by mouth dailyHistorical Med      rosuvastatin (CRESTOR) 10 MG tablet Take 10 mg by mouth dailyHistorical Med      fexofenadine (ALLEGRA ALLERGY) 180 MG tablet Take 180 mg by mouth dailyHistorical Med      amLODIPine (NORVASC) 2.5 MG tablet Take 2.5 mg by mouth dailyHistorical Med      vitamin B-1 (THIAMINE) 100 MG tablet Take 100 mg by mouth dailyHistorical Med      budesonide (PULMICORT) 0.25 MG/2ML nebulizer suspension Take 1 ampule by nebulization 2 times dailyHistorical Med      formoterol (PERFOROMIST) 20 MCG/2ML nebulizer solution Take 20 mcg by nebulization 2 times dailyHistorical Med      OXYGEN 3-4 L/day by Other route nightly as needed (As needed for shortness of breath and decreased SpO2)Historical Med             Time Spent on discharge is more than 45 minutes in the examination, evaluation, counseling and review of medications and discharge plan.       Signed:    Elen Paige MD   6/12/2019

## 2019-06-17 ENCOUNTER — NURSE ONLY (OUTPATIENT)
Dept: NON INVASIVE DIAGNOSTICS | Age: 74
End: 2019-06-17
Payer: MEDICARE

## 2019-06-17 DIAGNOSIS — I63.431 CEREBROVASCULAR ACCIDENT (CVA) DUE TO EMBOLISM OF RIGHT POSTERIOR CEREBRAL ARTERY (HCC): ICD-10-CM

## 2019-06-17 DIAGNOSIS — Z95.818 STATUS POST PLACEMENT OF IMPLANTABLE LOOP RECORDER: Primary | ICD-10-CM

## 2019-06-17 PROCEDURE — 93291 INTERROG DEV EVAL SCRMS IP: CPT | Performed by: INTERNAL MEDICINE

## 2019-06-17 NOTE — PATIENT INSTRUCTIONS
Call if any signs or symptoms of infection [phone] ext: [phone]      Hook up home monitor    [employer] [facility] [employer] [facility] ( home monitoring) [phone]

## 2019-07-18 ENCOUNTER — NURSE ONLY (OUTPATIENT)
Dept: NON INVASIVE DIAGNOSTICS | Age: 74
End: 2019-07-18
Payer: MEDICARE

## 2019-07-18 DIAGNOSIS — Z95.818 STATUS POST PLACEMENT OF IMPLANTABLE LOOP RECORDER: Primary | ICD-10-CM

## 2019-07-18 DIAGNOSIS — I63.431 CEREBROVASCULAR ACCIDENT (CVA) DUE TO EMBOLISM OF RIGHT POSTERIOR CEREBRAL ARTERY (HCC): ICD-10-CM

## 2019-07-18 PROCEDURE — 93298 REM INTERROG DEV EVAL SCRMS: CPT | Performed by: INTERNAL MEDICINE

## 2019-07-18 PROCEDURE — 93299 PR REM INTERROG ICPMS/SCRMS <30 D TECH REVIEW: CPT | Performed by: INTERNAL MEDICINE

## 2019-08-01 ENCOUNTER — TELEPHONE (OUTPATIENT)
Dept: NON INVASIVE DIAGNOSTICS | Age: 74
End: 2019-08-01

## 2019-08-01 NOTE — PROGRESS NOTES
See [employer] [place] report. Remote monitoring reviewed over a 30 day period. End of 30 day monitoring period date of service 7.18.2019.

## 2019-08-20 ENCOUNTER — NURSE ONLY (OUTPATIENT)
Dept: NON INVASIVE DIAGNOSTICS | Age: 74
End: 2019-08-20
Payer: MEDICARE

## 2019-08-20 DIAGNOSIS — Z95.818 STATUS POST PLACEMENT OF IMPLANTABLE LOOP RECORDER: Primary | ICD-10-CM

## 2019-08-20 DIAGNOSIS — I63.431 CEREBROVASCULAR ACCIDENT (CVA) DUE TO EMBOLISM OF RIGHT POSTERIOR CEREBRAL ARTERY (HCC): ICD-10-CM

## 2019-08-20 PROCEDURE — 93299 PR REM INTERROG ICPMS/SCRMS <30 D TECH REVIEW: CPT | Performed by: INTERNAL MEDICINE

## 2019-08-20 PROCEDURE — 93298 REM INTERROG DEV EVAL SCRMS: CPT | Performed by: INTERNAL MEDICINE

## 2019-08-30 ENCOUNTER — TELEPHONE (OUTPATIENT)
Dept: NON INVASIVE DIAGNOSTICS | Age: 74
End: 2019-08-30

## 2019-08-30 NOTE — TELEPHONE ENCOUNTER
----- Message from Devorah Guerrero RN sent at 8/30/2019  2:01 PM EDT -----  Successful transmission received. Please call patient and give next appointment.

## 2019-09-23 ENCOUNTER — NURSE ONLY (OUTPATIENT)
Dept: NON INVASIVE DIAGNOSTICS | Age: 74
End: 2019-09-23
Payer: MEDICARE

## 2019-09-23 DIAGNOSIS — I63.431 CEREBROVASCULAR ACCIDENT (CVA) DUE TO EMBOLISM OF RIGHT POSTERIOR CEREBRAL ARTERY (HCC): ICD-10-CM

## 2019-09-23 DIAGNOSIS — Z95.818 STATUS POST PLACEMENT OF IMPLANTABLE LOOP RECORDER: Primary | ICD-10-CM

## 2019-09-23 PROCEDURE — 93298 REM INTERROG DEV EVAL SCRMS: CPT | Performed by: INTERNAL MEDICINE

## 2019-09-23 PROCEDURE — 93299 PR REM INTERROG ICPMS/SCRMS <30 D TECH REVIEW: CPT | Performed by: INTERNAL MEDICINE

## 2019-10-08 ENCOUNTER — TELEPHONE (OUTPATIENT)
Dept: NON INVASIVE DIAGNOSTICS | Age: 74
End: 2019-10-08

## 2019-10-25 ENCOUNTER — NURSE ONLY (OUTPATIENT)
Dept: NON INVASIVE DIAGNOSTICS | Age: 74
End: 2019-10-25
Payer: MEDICARE

## 2019-10-25 DIAGNOSIS — I63.431 CEREBROVASCULAR ACCIDENT (CVA) DUE TO EMBOLISM OF RIGHT POSTERIOR CEREBRAL ARTERY (HCC): ICD-10-CM

## 2019-10-25 DIAGNOSIS — Z95.818 STATUS POST PLACEMENT OF IMPLANTABLE LOOP RECORDER: Primary | ICD-10-CM

## 2019-10-25 PROCEDURE — 93299 PR REM INTERROG ICPMS/SCRMS <30 D TECH REVIEW: CPT | Performed by: INTERNAL MEDICINE

## 2019-10-25 PROCEDURE — 93298 REM INTERROG DEV EVAL SCRMS: CPT | Performed by: INTERNAL MEDICINE

## 2019-11-26 ENCOUNTER — NURSE ONLY (OUTPATIENT)
Dept: NON INVASIVE DIAGNOSTICS | Age: 74
End: 2019-11-26
Payer: MEDICARE

## 2019-11-26 DIAGNOSIS — I63.431 CEREBROVASCULAR ACCIDENT (CVA) DUE TO EMBOLISM OF RIGHT POSTERIOR CEREBRAL ARTERY (HCC): ICD-10-CM

## 2019-11-26 DIAGNOSIS — Z95.818 STATUS POST PLACEMENT OF IMPLANTABLE LOOP RECORDER: Primary | ICD-10-CM

## 2019-11-26 PROCEDURE — 93298 REM INTERROG DEV EVAL SCRMS: CPT | Performed by: INTERNAL MEDICINE

## 2019-11-26 PROCEDURE — 93299 PR REM INTERROG ICPMS/SCRMS <30 D TECH REVIEW: CPT | Performed by: INTERNAL MEDICINE

## 2019-12-09 ENCOUNTER — TELEPHONE (OUTPATIENT)
Dept: CARDIOLOGY CLINIC | Age: 74
End: 2019-12-09

## 2019-12-27 ENCOUNTER — NURSE ONLY (OUTPATIENT)
Dept: NON INVASIVE DIAGNOSTICS | Age: 74
End: 2019-12-27
Payer: MEDICARE

## 2019-12-27 PROCEDURE — 93299 PR REM INTERROG ICPMS/SCRMS <30 D TECH REVIEW: CPT | Performed by: INTERNAL MEDICINE

## 2019-12-27 PROCEDURE — 93298 REM INTERROG DEV EVAL SCRMS: CPT | Performed by: INTERNAL MEDICINE

## 2020-01-01 ENCOUNTER — NURSE ONLY (OUTPATIENT)
Dept: NON INVASIVE DIAGNOSTICS | Age: 75
End: 2020-01-01
Payer: MEDICARE

## 2020-01-01 ENCOUNTER — TELEPHONE (OUTPATIENT)
Dept: NON INVASIVE DIAGNOSTICS | Age: 75
End: 2020-01-01

## 2020-01-01 DIAGNOSIS — Z95.818 STATUS POST PLACEMENT OF IMPLANTABLE LOOP RECORDER: Primary | ICD-10-CM

## 2020-01-01 DIAGNOSIS — I63.431 CEREBROVASCULAR ACCIDENT (CVA) DUE TO EMBOLISM OF RIGHT POSTERIOR CEREBRAL ARTERY (HCC): ICD-10-CM

## 2020-01-01 PROCEDURE — 93298 REM INTERROG DEV EVAL SCRMS: CPT | Performed by: INTERNAL MEDICINE

## 2020-01-01 PROCEDURE — G2066 INTER DEVC REMOTE 30D: HCPCS | Performed by: INTERNAL MEDICINE

## 2020-01-17 ENCOUNTER — TELEPHONE (OUTPATIENT)
Dept: NON INVASIVE DIAGNOSTICS | Age: 75
End: 2020-01-17

## 2020-01-17 NOTE — TELEPHONE ENCOUNTER
We have received your remote transmission. Our staff will contact you if there is anything that needs to be discussed. Your next appointment is January 27, 2020 for remote transmission. Patient's wife verbalized understanding.

## 2020-01-29 ENCOUNTER — NURSE ONLY (OUTPATIENT)
Dept: NON INVASIVE DIAGNOSTICS | Age: 75
End: 2020-01-29
Payer: MEDICARE

## 2020-01-29 PROCEDURE — 93298 REM INTERROG DEV EVAL SCRMS: CPT | Performed by: INTERNAL MEDICINE

## 2020-02-07 ENCOUNTER — TELEPHONE (OUTPATIENT)
Dept: NON INVASIVE DIAGNOSTICS | Age: 75
End: 2020-02-07

## 2020-02-07 NOTE — TELEPHONE ENCOUNTER
We have received your remote transmission. Our staff will contact you if there is anything that needs to be discussed. Your next appointment is March 2, 2020 for remote transmission. Patient's wife verbalized understanding.

## 2020-03-03 ENCOUNTER — NURSE ONLY (OUTPATIENT)
Dept: NON INVASIVE DIAGNOSTICS | Age: 75
End: 2020-03-03
Payer: MEDICARE

## 2020-03-03 PROCEDURE — 93298 REM INTERROG DEV EVAL SCRMS: CPT | Performed by: INTERNAL MEDICINE

## 2020-03-11 ENCOUNTER — TELEPHONE (OUTPATIENT)
Dept: NON INVASIVE DIAGNOSTICS | Age: 75
End: 2020-03-11

## 2020-05-20 ENCOUNTER — NURSE ONLY (OUTPATIENT)
Dept: NON INVASIVE DIAGNOSTICS | Age: 75
End: 2020-05-20
Payer: MEDICARE

## 2020-05-20 PROCEDURE — G2066 INTER DEVC REMOTE 30D: HCPCS | Performed by: INTERNAL MEDICINE

## 2020-05-20 PROCEDURE — 93298 REM INTERROG DEV EVAL SCRMS: CPT | Performed by: INTERNAL MEDICINE

## 2020-06-03 ENCOUNTER — TELEPHONE (OUTPATIENT)
Dept: NON INVASIVE DIAGNOSTICS | Age: 75
End: 2020-06-03

## 2020-06-22 ENCOUNTER — NURSE ONLY (OUTPATIENT)
Dept: NON INVASIVE DIAGNOSTICS | Age: 75
End: 2020-06-22
Payer: MEDICARE

## 2020-06-22 PROCEDURE — 93298 REM INTERROG DEV EVAL SCRMS: CPT | Performed by: INTERNAL MEDICINE

## 2020-06-22 PROCEDURE — G2066 INTER DEVC REMOTE 30D: HCPCS | Performed by: INTERNAL MEDICINE

## 2020-06-23 ENCOUNTER — TELEPHONE (OUTPATIENT)
Dept: NON INVASIVE DIAGNOSTICS | Age: 75
End: 2020-06-23

## 2020-06-23 NOTE — TELEPHONE ENCOUNTER
Left detailed message that we have received your remote transmission. Also that he is due to schedule appointment with our office. Our staff will contact you if there is anything that needs to be discussed. Your next appointment is July 24, 2020 for remote transmission.

## 2020-10-20 NOTE — PROGRESS NOTES
See [employer] [place] report. Remote monitoring reviewed over a 30 day period. End of 30 day monitoring period date of service 10.10.2020.

## 2020-10-26 NOTE — TELEPHONE ENCOUNTER
We have received your remote transmission. Our staff will contact you if there is anything that needs to be discussed. Your next appointment is 11/12/2020 remote transmission from home. Spoke to patient wife. Verbalized understanding of next transmission date.

## 2020-10-26 NOTE — TELEPHONE ENCOUNTER
----- Message from Henry Vogt RN sent at 10/20/2020  2:37 PM EDT -----  Successful transmission received. Please call patient and give next appointment.

## 2020-12-01 NOTE — PROGRESS NOTES
See [employer] [place] report. Remote monitoring reviewed over a 30 day period. End of 30 day monitoring period date of service 11.16.2020.

## 2020-12-01 NOTE — TELEPHONE ENCOUNTER
----- Message from Kavin Peabody, RN sent at 12/1/2020 10:50 AM EST -----  Successful transmission received. Please call patient and give next appointment.

## 2020-12-01 NOTE — TELEPHONE ENCOUNTER
We have received your remote transmission. Our staff will contact you if there is anything that needs to be discussed. Your next appointment is 12/17/2020 remote transmission from home. Spoke to patients wife. Verbalized understanding of next transmission date.

## 2021-01-01 ENCOUNTER — TELEPHONE (OUTPATIENT)
Dept: NON INVASIVE DIAGNOSTICS | Age: 76
End: 2021-01-01

## 2021-01-01 ENCOUNTER — NURSE ONLY (OUTPATIENT)
Dept: NON INVASIVE DIAGNOSTICS | Age: 76
End: 2021-01-01
Payer: MEDICARE

## 2021-01-01 ENCOUNTER — NURSE ONLY (OUTPATIENT)
Dept: NON INVASIVE DIAGNOSTICS | Age: 76
End: 2021-01-01

## 2021-01-01 DIAGNOSIS — I63.431 CEREBROVASCULAR ACCIDENT (CVA) DUE TO EMBOLISM OF RIGHT POSTERIOR CEREBRAL ARTERY (HCC): ICD-10-CM

## 2021-01-01 DIAGNOSIS — Z95.818 STATUS POST PLACEMENT OF IMPLANTABLE LOOP RECORDER: Primary | ICD-10-CM

## 2021-01-01 PROCEDURE — 93298 REM INTERROG DEV EVAL SCRMS: CPT | Performed by: INTERNAL MEDICINE

## 2021-01-01 PROCEDURE — G2066 INTER DEVC REMOTE 30D: HCPCS | Performed by: INTERNAL MEDICINE

## 2021-01-08 NOTE — PROGRESS NOTES
See [employer] [place] report. Remote monitoring reviewed over a 30 day period. End of 30 day monitoring period date of service 12.21.2020.

## 2021-01-08 NOTE — TELEPHONE ENCOUNTER
We have received your remote transmission. Our staff will contact you if there is anything that needs to be discussed. Your next appointment is 01/21/2021 remote transmission from home. Spoke to patient. Verbalized understanding of next transmission date. verbal instruction/individual instruction/written material/skill demonstration

## 2021-01-08 NOTE — TELEPHONE ENCOUNTER
----- Message from Lizzy Thapa RN sent at 1/8/2021 10:28 AM EST -----  Successful transmission received. Please call patient and give next appointment.

## 2021-02-02 NOTE — PROGRESS NOTES
See [employer] [place] report. Remote monitoring reviewed over a 30 day period. End of 30 day monitoring period date of service  1.22.2021.

## 2021-02-02 NOTE — TELEPHONE ENCOUNTER
----- Message from Dot Arroyo RN sent at 2/2/2021  9:10 AM EST -----  Successful transmission received. Please call patient and give next appointment.

## 2021-02-02 NOTE — TELEPHONE ENCOUNTER
We have received your remote transmission. Our staff will contact you if there is anything that needs to be discussed. Your next appointment is 02/22/2021 remote transmission from home. Spoke to patient. Verbalized understanding of next transmission date.

## 2021-03-17 NOTE — PROGRESS NOTES
See [employer] [place] report. Remote monitoring reviewed over a 30 day period.   End of 30 day monitoring period date of service 2.23.2021

## 2021-03-25 NOTE — TELEPHONE ENCOUNTER
----- Message from Meribeth Rinne, RN sent at 3/17/2021  8:21 AM EDT -----  Successful transmission received. Please call patient and give next appointment.

## 2021-03-25 NOTE — TELEPHONE ENCOUNTER
We have received your remote transmission. Our staff will contact you if there is anything that needs to be discussed. Your next appointment is 03/26/2021 remote transmission from home. Left message with the next remote transmission date.

## 2021-03-30 NOTE — PROGRESS NOTES
See [employer] [place] report. Remote monitoring reviewed over a 30 day period. End of 30 day monitoring period date of service 3.27.2021. vaginal discharge/DIFFICULTY URINATING